# Patient Record
Sex: MALE | Race: WHITE | NOT HISPANIC OR LATINO
[De-identification: names, ages, dates, MRNs, and addresses within clinical notes are randomized per-mention and may not be internally consistent; named-entity substitution may affect disease eponyms.]

---

## 2019-07-10 ENCOUNTER — RECORD ABSTRACTING (OUTPATIENT)
Age: 44
End: 2019-07-10

## 2019-07-10 DIAGNOSIS — Z78.9 OTHER SPECIFIED HEALTH STATUS: ICD-10-CM

## 2019-07-10 DIAGNOSIS — Z87.2 PERSONAL HISTORY OF DISEASES OF THE SKIN AND SUBCUTANEOUS TISSUE: ICD-10-CM

## 2019-07-10 DIAGNOSIS — Z87.19 PERSONAL HISTORY OF OTHER DISEASES OF THE DIGESTIVE SYSTEM: ICD-10-CM

## 2019-07-10 PROBLEM — Z00.00 ENCOUNTER FOR PREVENTIVE HEALTH EXAMINATION: Status: ACTIVE | Noted: 2019-07-10

## 2019-07-24 ENCOUNTER — APPOINTMENT (OUTPATIENT)
Dept: INTERNAL MEDICINE | Facility: CLINIC | Age: 44
End: 2019-07-24
Payer: COMMERCIAL

## 2019-07-24 VITALS
WEIGHT: 210 LBS | DIASTOLIC BLOOD PRESSURE: 80 MMHG | BODY MASS INDEX: 31.83 KG/M2 | HEIGHT: 68 IN | HEART RATE: 69 BPM | OXYGEN SATURATION: 98 % | TEMPERATURE: 98.9 F | SYSTOLIC BLOOD PRESSURE: 126 MMHG

## 2019-07-24 DIAGNOSIS — K64.9 UNSPECIFIED HEMORRHOIDS: ICD-10-CM

## 2019-07-24 DIAGNOSIS — Z80.8 FAMILY HISTORY OF MALIGNANT NEOPLASM OF OTHER ORGANS OR SYSTEMS: ICD-10-CM

## 2019-07-24 PROCEDURE — G0444 DEPRESSION SCREEN ANNUAL: CPT

## 2019-07-24 PROCEDURE — 82274 ASSAY TEST FOR BLOOD FECAL: CPT | Mod: QW

## 2019-07-24 PROCEDURE — 82270 OCCULT BLOOD FECES: CPT

## 2019-07-24 PROCEDURE — 36415 COLL VENOUS BLD VENIPUNCTURE: CPT

## 2019-07-24 PROCEDURE — 99396 PREV VISIT EST AGE 40-64: CPT | Mod: 25

## 2019-08-09 LAB
ALBUMIN SERPL ELPH-MCNC: 5 G/DL
ALP BLD-CCNC: 51 U/L
ALT SERPL-CCNC: 39 U/L
ANION GAP SERPL CALC-SCNC: 14 MMOL/L
AST SERPL-CCNC: 25 U/L
BASOPHILS # BLD AUTO: 0.03 K/UL
BASOPHILS NFR BLD AUTO: 0.7 %
BILIRUB SERPL-MCNC: 0.3 MG/DL
BUN SERPL-MCNC: 12 MG/DL
CALCIUM SERPL-MCNC: 9.4 MG/DL
CHLORIDE SERPL-SCNC: 102 MMOL/L
CHOLEST SERPL-MCNC: 236 MG/DL
CHOLEST/HDLC SERPL: 5.2 RATIO
CO2 SERPL-SCNC: 22 MMOL/L
CREAT SERPL-MCNC: 0.81 MG/DL
DATE COLLECTED: NORMAL
EOSINOPHIL # BLD AUTO: 0.11 K/UL
EOSINOPHIL NFR BLD AUTO: 2.6 %
ESTIMATED AVERAGE GLUCOSE: 111 MG/DL
GLUCOSE SERPL-MCNC: 120 MG/DL
HBA1C MFR BLD HPLC: 5.5 %
HCT VFR BLD CALC: 49.1 %
HDLC SERPL-MCNC: 45 MG/DL
HEMOCCULT SP1 STL QL: NEGATIVE
HGB BLD-MCNC: 16 G/DL
IMM GRANULOCYTES NFR BLD AUTO: 0.7 %
LDLC SERPL CALC-MCNC: 147 MG/DL
LYMPHOCYTES # BLD AUTO: 1.18 K/UL
LYMPHOCYTES NFR BLD AUTO: 28.3 %
MAN DIFF?: NORMAL
MCHC RBC-ENTMCNC: 30.5 PG
MCHC RBC-ENTMCNC: 32.6 GM/DL
MCV RBC AUTO: 93.5 FL
MONOCYTES # BLD AUTO: 0.36 K/UL
MONOCYTES NFR BLD AUTO: 8.6 %
NEUTROPHILS # BLD AUTO: 2.46 K/UL
NEUTROPHILS NFR BLD AUTO: 59.1 %
PLATELET # BLD AUTO: 231 K/UL
POTASSIUM SERPL-SCNC: 4.2 MMOL/L
PROT SERPL-MCNC: 7 G/DL
PSA FREE FLD-MCNC: 35 %
PSA FREE SERPL-MCNC: 0.22 NG/ML
PSA SERPL-MCNC: 0.63 NG/ML
QUALITY CONTROL: YES
RBC # BLD: 5.25 M/UL
RBC # FLD: 11.9 %
SODIUM SERPL-SCNC: 138 MMOL/L
TRIGL SERPL-MCNC: 221 MG/DL
TSH SERPL-ACNC: 2.45 UIU/ML
WBC # FLD AUTO: 4.17 K/UL

## 2019-08-15 ENCOUNTER — APPOINTMENT (OUTPATIENT)
Dept: GASTROENTEROLOGY | Facility: CLINIC | Age: 44
End: 2019-08-15
Payer: COMMERCIAL

## 2019-08-15 VITALS
HEIGHT: 68 IN | HEART RATE: 59 BPM | BODY MASS INDEX: 31.83 KG/M2 | OXYGEN SATURATION: 98 % | SYSTOLIC BLOOD PRESSURE: 120 MMHG | TEMPERATURE: 98.4 F | DIASTOLIC BLOOD PRESSURE: 70 MMHG | WEIGHT: 210 LBS

## 2019-08-15 DIAGNOSIS — R19.5 OTHER FECAL ABNORMALITIES: ICD-10-CM

## 2019-08-15 PROCEDURE — 99244 OFF/OP CNSLTJ NEW/EST MOD 40: CPT

## 2019-08-15 NOTE — PHYSICAL EXAM
[General Appearance - Alert] : alert [General Appearance - In No Acute Distress] : in no acute distress [Sclera] : the sclera and conjunctiva were normal [Outer Ear] : the ears and nose were normal in appearance [Neck Appearance] : the appearance of the neck was normal [] : no respiratory distress [Abnormal Walk] : normal gait [Skin Color & Pigmentation] : normal skin color and pigmentation [Cranial Nerves] : cranial nerves 2-12 were intact [Oriented To Time, Place, And Person] : oriented to person, place, and time

## 2019-08-15 NOTE — HISTORY OF PRESENT ILLNESS
[FreeTextEntry1] : Ms. Durand is a pleasant 43M h/o psoriatic arthritis (no medications) who is seen at the request of Dr. Mahajan for evaluation of change in the caliber and frequency of his stool.\par \par Reports a long history of alternating constipation and diarrhea.  When he is having diarrhea, he will have 5-10 BMs daily, loose brown stool with mucus, no blood.  Has associated lower abdominal cramps.\par \par Currently he is complaining of constipation with hard brown stool, BM every 2-3 days, has noticed narrowing of his stool and occasional mucus.  He has lost weight through dietary changes with intermittent fasting.  Used to drink up to 4 drinks nightly, has stopped this for over 2 years, now will only have a few drinks on the weekends.\par \par No family history of colorectal or other GI malignancies.\par \par Has never had a colonoscopy.

## 2019-08-15 NOTE — ASSESSMENT
[FreeTextEntry1] : Will plan on a colonoscopy for change in the caliber and frequency of stool, mucus in stool - r/o IBD given his psoriatic arthritis, mucus in stool.  Explained risks/benefits/alternatives including not limited to bleeding, infection, perforation, missed lesions, anesthesia complications.  Patient understands and agrees, all questions answered.  Will use a split dose miralax/gatorade prep with clears the day prior.\par \par Will need colonic biopsies during his colonoscopy.\par \par Thank you for referring Mr. GILLESPIE.  Please do not hesitate to call to further discuss his/her care.\par \par Note faxed to requesting MD.\par \par

## 2019-08-22 ENCOUNTER — TRANSCRIPTION ENCOUNTER (OUTPATIENT)
Age: 44
End: 2019-08-22

## 2019-09-24 ENCOUNTER — CHART COPY (OUTPATIENT)
Age: 44
End: 2019-09-24

## 2019-10-18 ENCOUNTER — RESULT REVIEW (OUTPATIENT)
Age: 44
End: 2019-10-18

## 2019-10-19 ENCOUNTER — APPOINTMENT (OUTPATIENT)
Dept: GASTROENTEROLOGY | Facility: HOSPITAL | Age: 44
End: 2019-10-19

## 2019-12-05 ENCOUNTER — APPOINTMENT (OUTPATIENT)
Dept: INTERNAL MEDICINE | Facility: CLINIC | Age: 44
End: 2019-12-05

## 2020-03-05 ENCOUNTER — APPOINTMENT (OUTPATIENT)
Dept: INTERNAL MEDICINE | Facility: CLINIC | Age: 45
End: 2020-03-05
Payer: COMMERCIAL

## 2020-03-05 ENCOUNTER — NON-APPOINTMENT (OUTPATIENT)
Age: 45
End: 2020-03-05

## 2020-03-05 VITALS
HEIGHT: 68 IN | WEIGHT: 195 LBS | SYSTOLIC BLOOD PRESSURE: 122 MMHG | DIASTOLIC BLOOD PRESSURE: 80 MMHG | HEART RATE: 56 BPM | BODY MASS INDEX: 29.55 KG/M2 | TEMPERATURE: 97.9 F | OXYGEN SATURATION: 98 %

## 2020-03-05 PROCEDURE — 99214 OFFICE O/P EST MOD 30 MIN: CPT | Mod: 25

## 2020-03-05 PROCEDURE — 93000 ELECTROCARDIOGRAM COMPLETE: CPT

## 2020-03-05 PROCEDURE — 36415 COLL VENOUS BLD VENIPUNCTURE: CPT

## 2020-03-05 RX ORDER — OMEPRAZOLE MAGNESIUM 20 MG/1
20 TABLET, DELAYED RELEASE ORAL DAILY
Refills: 0 | Status: DISCONTINUED | COMMUNITY
Start: 2019-07-24 | End: 2020-03-05

## 2020-03-08 ENCOUNTER — NON-APPOINTMENT (OUTPATIENT)
Age: 45
End: 2020-03-08

## 2020-03-09 ENCOUNTER — APPOINTMENT (OUTPATIENT)
Dept: CARDIOLOGY | Facility: CLINIC | Age: 45
End: 2020-03-09
Payer: COMMERCIAL

## 2020-03-09 DIAGNOSIS — R00.2 PALPITATIONS: ICD-10-CM

## 2020-03-09 PROCEDURE — 0296T: CPT

## 2020-03-09 PROCEDURE — 99204 OFFICE O/P NEW MOD 45 MIN: CPT

## 2020-03-19 PROCEDURE — 0298T: CPT

## 2020-03-20 LAB
ALBUMIN SERPL ELPH-MCNC: 4.9 G/DL
ALP BLD-CCNC: 50 U/L
ALT SERPL-CCNC: 29 U/L
ANION GAP SERPL CALC-SCNC: 14 MMOL/L
AST SERPL-CCNC: 32 U/L
BILIRUB SERPL-MCNC: 0.3 MG/DL
BUN SERPL-MCNC: 13 MG/DL
CALCIUM SERPL-MCNC: 9.6 MG/DL
CHLORIDE SERPL-SCNC: 102 MMOL/L
CHOLEST SERPL-MCNC: 206 MG/DL
CHOLEST/HDLC SERPL: 3.3 RATIO
CO2 SERPL-SCNC: 22 MMOL/L
CREAT SERPL-MCNC: 0.85 MG/DL
ESTIMATED AVERAGE GLUCOSE: 108 MG/DL
GLUCOSE SERPL-MCNC: 118 MG/DL
HBA1C MFR BLD HPLC: 5.4 %
HDLC SERPL-MCNC: 62 MG/DL
LDLC SERPL CALC-MCNC: 126 MG/DL
POTASSIUM SERPL-SCNC: 4.5 MMOL/L
PROT SERPL-MCNC: 6.8 G/DL
SODIUM SERPL-SCNC: 138 MMOL/L
TRIGL SERPL-MCNC: 89 MG/DL
TSH SERPL-ACNC: 2.16 UIU/ML

## 2021-11-04 ENCOUNTER — APPOINTMENT (OUTPATIENT)
Dept: INTERNAL MEDICINE | Facility: CLINIC | Age: 46
End: 2021-11-04

## 2021-11-08 ENCOUNTER — APPOINTMENT (OUTPATIENT)
Dept: FAMILY MEDICINE | Facility: CLINIC | Age: 46
End: 2021-11-08
Payer: COMMERCIAL

## 2021-11-08 VITALS
RESPIRATION RATE: 16 BRPM | HEART RATE: 72 BPM | SYSTOLIC BLOOD PRESSURE: 130 MMHG | TEMPERATURE: 98.9 F | OXYGEN SATURATION: 98 % | WEIGHT: 190 LBS | DIASTOLIC BLOOD PRESSURE: 82 MMHG | HEIGHT: 68 IN | BODY MASS INDEX: 28.79 KG/M2

## 2021-11-08 DIAGNOSIS — M54.16 RADICULOPATHY, LUMBAR REGION: ICD-10-CM

## 2021-11-08 DIAGNOSIS — Z23 ENCOUNTER FOR IMMUNIZATION: ICD-10-CM

## 2021-11-08 PROCEDURE — 90686 IIV4 VACC NO PRSV 0.5 ML IM: CPT

## 2021-11-08 PROCEDURE — 36415 COLL VENOUS BLD VENIPUNCTURE: CPT

## 2021-11-08 PROCEDURE — 99396 PREV VISIT EST AGE 40-64: CPT | Mod: 25

## 2021-11-08 PROCEDURE — G0008: CPT

## 2021-11-11 LAB
ANION GAP SERPL CALC-SCNC: 12 MMOL/L
BUN SERPL-MCNC: 10 MG/DL
CALCIUM SERPL-MCNC: 9.7 MG/DL
CHLORIDE SERPL-SCNC: 98 MMOL/L
CHOLEST SERPL-MCNC: 200 MG/DL
CO2 SERPL-SCNC: 24 MMOL/L
CREAT SERPL-MCNC: 0.83 MG/DL
GLUCOSE SERPL-MCNC: 109 MG/DL
HDLC SERPL-MCNC: 53 MG/DL
LDLC SERPL CALC-MCNC: 117 MG/DL
NONHDLC SERPL-MCNC: 146 MG/DL
POTASSIUM SERPL-SCNC: 4.4 MMOL/L
SODIUM SERPL-SCNC: 134 MMOL/L
TRIGL SERPL-MCNC: 147 MG/DL

## 2021-12-24 ENCOUNTER — TRANSCRIPTION ENCOUNTER (OUTPATIENT)
Age: 46
End: 2021-12-24

## 2023-10-09 ENCOUNTER — APPOINTMENT (OUTPATIENT)
Dept: INTERNAL MEDICINE | Facility: CLINIC | Age: 48
End: 2023-10-09
Payer: COMMERCIAL

## 2023-10-09 ENCOUNTER — NON-APPOINTMENT (OUTPATIENT)
Age: 48
End: 2023-10-09

## 2023-10-09 VITALS
OXYGEN SATURATION: 99 % | DIASTOLIC BLOOD PRESSURE: 80 MMHG | TEMPERATURE: 98.6 F | HEIGHT: 68 IN | SYSTOLIC BLOOD PRESSURE: 122 MMHG | WEIGHT: 196 LBS | RESPIRATION RATE: 16 BRPM | HEART RATE: 65 BPM | BODY MASS INDEX: 29.7 KG/M2

## 2023-10-09 DIAGNOSIS — R73.09 OTHER ABNORMAL GLUCOSE: ICD-10-CM

## 2023-10-09 DIAGNOSIS — N52.9 MALE ERECTILE DYSFUNCTION, UNSPECIFIED: ICD-10-CM

## 2023-10-09 DIAGNOSIS — S22.42XA MULTIPLE FRACTURES OF RIBS, LEFT SIDE, INITIAL ENCOUNTER FOR CLOSED FRACTURE: ICD-10-CM

## 2023-10-09 DIAGNOSIS — Z12.5 ENCOUNTER FOR SCREENING FOR MALIGNANT NEOPLASM OF PROSTATE: ICD-10-CM

## 2023-10-09 DIAGNOSIS — Z00.00 ENCOUNTER FOR GENERAL ADULT MEDICAL EXAMINATION W/OUT ABNORMAL FINDINGS: ICD-10-CM

## 2023-10-09 DIAGNOSIS — D22.9 MELANOCYTIC NEVI, UNSPECIFIED: ICD-10-CM

## 2023-10-09 DIAGNOSIS — Z11.9 ENCOUNTER FOR SCREENING FOR INFECTIOUS AND PARASITIC DISEASES, UNSPECIFIED: ICD-10-CM

## 2023-10-09 DIAGNOSIS — E78.5 HYPERLIPIDEMIA, UNSPECIFIED: ICD-10-CM

## 2023-10-09 DIAGNOSIS — R39.198 OTHER DIFFICULTIES WITH MICTURITION: ICD-10-CM

## 2023-10-09 PROCEDURE — 36415 COLL VENOUS BLD VENIPUNCTURE: CPT

## 2023-10-09 PROCEDURE — 90686 IIV4 VACC NO PRSV 0.5 ML IM: CPT

## 2023-10-09 PROCEDURE — 99213 OFFICE O/P EST LOW 20 MIN: CPT | Mod: 25

## 2023-10-09 PROCEDURE — G0008: CPT

## 2023-10-09 PROCEDURE — 99396 PREV VISIT EST AGE 40-64: CPT | Mod: 25

## 2023-10-12 PROBLEM — E78.5 HYPERLIPIDEMIA, UNSPECIFIED HYPERLIPIDEMIA TYPE: Status: ACTIVE | Noted: 2020-03-05

## 2023-10-12 PROBLEM — D22.9 NUMEROUS SKIN MOLES: Status: ACTIVE | Noted: 2019-07-24

## 2023-10-12 PROBLEM — S22.42XA MULTIPLE FRACTURES OF RIBS OF LEFT SIDE: Status: ACTIVE | Noted: 2023-10-12

## 2023-10-28 LAB
ALBUMIN SERPL ELPH-MCNC: 5 G/DL
ALP BLD-CCNC: 74 U/L
ALT SERPL-CCNC: 30 U/L
ANION GAP SERPL CALC-SCNC: 13 MMOL/L
APPEARANCE: CLEAR
AST SERPL-CCNC: 29 U/L
BACTERIA: NEGATIVE /HPF
BASOPHILS # BLD AUTO: 0.05 K/UL
BASOPHILS NFR BLD AUTO: 0.9 %
BILIRUB SERPL-MCNC: 0.4 MG/DL
BILIRUBIN URINE: NEGATIVE
BLOOD URINE: NEGATIVE
BUN SERPL-MCNC: 11 MG/DL
CALCIUM SERPL-MCNC: 9.5 MG/DL
CAST: 0 /LPF
CHLORIDE SERPL-SCNC: 101 MMOL/L
CHOLEST SERPL-MCNC: 232 MG/DL
CO2 SERPL-SCNC: 23 MMOL/L
COLOR: YELLOW
CREAT SERPL-MCNC: 0.81 MG/DL
EGFR: 107 ML/MIN/1.73M2
EOSINOPHIL # BLD AUTO: 0.11 K/UL
EOSINOPHIL NFR BLD AUTO: 1.9 %
EPITHELIAL CELLS: 0 /HPF
ESTIMATED AVERAGE GLUCOSE: 108 MG/DL
GLUCOSE QUALITATIVE U: NEGATIVE MG/DL
GLUCOSE SERPL-MCNC: 98 MG/DL
HBA1C MFR BLD HPLC: 5.4 %
HCT VFR BLD CALC: 43.1 %
HCV AB SER QL: NONREACTIVE
HCV S/CO RATIO: 0.06 S/CO
HDLC SERPL-MCNC: 47 MG/DL
HGB BLD-MCNC: 15 G/DL
HIV1+2 AB SPEC QL IA.RAPID: NONREACTIVE
IMM GRANULOCYTES NFR BLD AUTO: 0.5 %
KETONES URINE: NEGATIVE MG/DL
LDLC SERPL CALC-MCNC: 153 MG/DL
LEUKOCYTE ESTERASE URINE: NEGATIVE
LYMPHOCYTES # BLD AUTO: 1.41 K/UL
LYMPHOCYTES NFR BLD AUTO: 24.2 %
MAN DIFF?: NORMAL
MCHC RBC-ENTMCNC: 31.1 PG
MCHC RBC-ENTMCNC: 34.8 GM/DL
MCV RBC AUTO: 89.4 FL
MICROSCOPIC-UA: NORMAL
MONOCYTES # BLD AUTO: 0.43 K/UL
MONOCYTES NFR BLD AUTO: 7.4 %
NEUTROPHILS # BLD AUTO: 3.8 K/UL
NEUTROPHILS NFR BLD AUTO: 65.1 %
NITRITE URINE: NEGATIVE
NONHDLC SERPL-MCNC: 185 MG/DL
PH URINE: 7
PLATELET # BLD AUTO: 245 K/UL
POTASSIUM SERPL-SCNC: 4.1 MMOL/L
PROT SERPL-MCNC: 7 G/DL
PROTEIN URINE: NEGATIVE MG/DL
PSA FREE FLD-MCNC: 25 %
PSA FREE SERPL-MCNC: 0.16 NG/ML
PSA SERPL-MCNC: 0.63 NG/ML
RBC # BLD: 4.82 M/UL
RBC # FLD: 11.9 %
RED BLOOD CELLS URINE: 0 /HPF
SODIUM SERPL-SCNC: 138 MMOL/L
SPECIFIC GRAVITY URINE: 1.01
TESTOST FREE SERPL-MCNC: 5.8 PG/ML
TESTOST SERPL-MCNC: 335 NG/DL
TRIGL SERPL-MCNC: 176 MG/DL
TSH SERPL-ACNC: 1.44 UIU/ML
UROBILINOGEN URINE: 0.2 MG/DL
WBC # FLD AUTO: 5.83 K/UL
WHITE BLOOD CELLS URINE: 0 /HPF

## 2024-02-27 ENCOUNTER — NON-APPOINTMENT (OUTPATIENT)
Age: 49
End: 2024-02-27

## 2024-06-17 ENCOUNTER — APPOINTMENT (OUTPATIENT)
Dept: NEUROLOGY | Facility: CLINIC | Age: 49
End: 2024-06-17
Payer: COMMERCIAL

## 2024-06-17 ENCOUNTER — NON-APPOINTMENT (OUTPATIENT)
Age: 49
End: 2024-06-17

## 2024-06-17 VITALS
BODY MASS INDEX: 30.31 KG/M2 | SYSTOLIC BLOOD PRESSURE: 138 MMHG | HEART RATE: 57 BPM | DIASTOLIC BLOOD PRESSURE: 89 MMHG | HEIGHT: 68 IN | OXYGEN SATURATION: 96 % | WEIGHT: 200 LBS

## 2024-06-17 DIAGNOSIS — R25.2 CRAMP AND SPASM: ICD-10-CM

## 2024-06-17 DIAGNOSIS — M62.838 OTHER MUSCLE SPASM: ICD-10-CM

## 2024-06-17 DIAGNOSIS — R25.3 FASCICULATION: ICD-10-CM

## 2024-06-17 PROCEDURE — 99205 OFFICE O/P NEW HI 60 MIN: CPT

## 2024-06-17 RX ORDER — IBUPROFEN 200 MG/1
200 TABLET, COATED ORAL 3 TIMES DAILY
Refills: 0 | Status: DISCONTINUED | COMMUNITY
Start: 2023-10-09 | End: 2024-06-17

## 2024-06-19 NOTE — HISTORY OF PRESENT ILLNESS
[FreeTextEntry1] : Mr. ANGELA GILLESPIE is a 48 year old male here today in neurology consultation for reports of involuntary muscle spasms and muscular atrophy. Hx hld, 2 left lower rib fractures & splenic contusion s/p fall in 2023, lumbar radiculopathy, R foot sprain, and psoriatic arthritis on no medication.   Kevin Barajas noticed atrophy of his LLE about 3 years ago. At that time, he developed left hip pain while doing stationary bike exercises and went to [facility] ED for severe pain. MRI showed lumbar radiculopathy. Saw 2 ortho spine specialists; one of whom recommended consultation with ortho hip specialist who administered a steroid hip injection with resolution of left hip pain.   For the past few years his exercise regimen is swimming daily, stationary bike and walking for 90 mins. A year ago, lateral rotation of left hip during exercises caused "popping." This is now reduced with exercises. Last left hip x-ray several years ago reportedly revealed mild OA. Last PT sessions for left hip pain were several years ago, and PT also recommended aqua therapy which pt completed then continued daily swimming.   Pt reports his left leg easily cramps during certain exercises. Cramps affect sole of feet under the toes only and the toes daily. Standing on the feet relieves cramping promptly. LLE affected by involuntary painless muscle twitching along entire leg, along medial surface c/w fasciculations, present for about one year.   Reports right foot sprain this past year that included decreased sensation and slight numbness underneath R toes.   No medications or supplements Water - 32 oz bottle x 3-4 daily.  Exercise - 90 daily   Teri Stewart NP collaborated with the neurologist during today's visit.

## 2024-06-19 NOTE — PHYSICAL EXAM
[FreeTextEntry1] : Physical Exam In NAD Mood appropriate  Negative R & L straight leg raise.  External rotation of R and L hip wnl.  Mental Status Awake, alert and oriented to person, place, time and situation. Provides detailed history.   Cranial Nerves II: VFF III, IV, VI: PERRL, EOMI. V: Facial sensation is normal B/L. VII: Facial strength is symmetric. VIII: wnl IX, X: Palate is midline and elevates symmetrically. XI: Trapezius normal strength. XII: Tongue midline without atrophy or fasciculations.  MOTOR EXAM Muscle tone - no evidence of rigidity or resistance in all 4 extremities. No muscle atrophy  (+) RLE calf muscle fasciculations with stimulation.  Muscle Strength: arms and legs, proximal and distal flexors and extensors are normal. No UE drift. No tremor with hands outstretched    REFLEXES  All present, normal, and symmetrical; B biceps/triceps/radial 1+; B ankle 1+ and B patella 2+ Right Plantar: mute. Left Plantar: mute.   COORDINATION Finger to nose: Normal. Heel to shin: Normal.   SENSORY Vibration: intact  Sensation: light touch, pin prick distribution and cold intact   GAIT Normal. Tandem walk normal. Able to walk on toes and heels. Romberg negative.

## 2024-06-19 NOTE — ASSESSMENT
[FreeTextEntry1] : Kevin Barajas is experiencing LLE fasciculations and bilateral toe cramps daily. He is drinking about 128 oz water daily while completing 90 minutes of daily exercise. He is not able to run related to fear of foot cramping and the concern for a possible injury as a result.   Plan of Care - Lab work ordered Testing - EMG and NCV ordered Warm up exercises before all exercise   Referral - to primary care provider at pt's request to establish care  Neurology follow-up TBD when testing is complete    Recommendations for Brain Health - - healthy eating/diet with diet rich in fiber, fruits, vegetables, and low in saturated fats, processed foods. - good sleep, 7-8 hours a night. No use of phone or watching television before bed. - aerobic exercise, at least 30 minutes, such as a brisk walk 3-4 times a week. - abstaining from excess alcohol, or drug use. - blood pressure, cholesterol, blood glucose monitoring to prevent microvascular disease which can lead to cognitive impairment. - obtain adequate water intake. 8 cups of water daily or as recommended by your pcp if fluid restricted related to medical condition.

## 2024-06-19 NOTE — REASON FOR VISIT
[Consultation] : a consultation visit [FreeTextEntry1] : Pt came today involuntary muscle spasm and muscular atrophy.

## 2024-08-01 ENCOUNTER — APPOINTMENT (OUTPATIENT)
Dept: NEUROLOGY | Facility: CLINIC | Age: 49
End: 2024-08-01
Payer: COMMERCIAL

## 2024-08-01 DIAGNOSIS — E55.9 VITAMIN D DEFICIENCY, UNSPECIFIED: ICD-10-CM

## 2024-08-01 DIAGNOSIS — M54.16 RADICULOPATHY, LUMBAR REGION: ICD-10-CM

## 2024-08-01 DIAGNOSIS — R25.3 FASCICULATION: ICD-10-CM

## 2024-08-01 PROCEDURE — 95910 NRV CNDJ TEST 7-8 STUDIES: CPT

## 2024-08-01 PROCEDURE — 95886 MUSC TEST DONE W/N TEST COMP: CPT

## 2024-08-01 PROCEDURE — 99214 OFFICE O/P EST MOD 30 MIN: CPT | Mod: 25

## 2024-08-01 NOTE — ASSESSMENT
[FreeTextEntry1] : Kevin Barajas is experiencing LLE fasciculations and bilateral toe cramps daily. He is drinking about 128 oz water daily while completing 90 minutes of daily exercise. He is not able to run related to fear of foot cramping and the concern for a possible injury as a result.   EMG/NCV raises concern for lumbar radiculopathy - this may explain his calf atrophy and fasciculations. EMG did not show any evidence of neuropathy or motor neuron disease.

## 2024-08-01 NOTE — HISTORY OF PRESENT ILLNESS
[FreeTextEntry1] : 8/1/31 Continues to have fasciculations in the left leg -  left calf smaller than right calf feels left leg weaker  H/o LBP with radiation into hip and leg at one point. Had left hip steroid injection which resolved hip pain.   Able to swim.  Notes cramping in his extremities. Has some swelling in ankles (right greater than left)- h/p psoriatic arthritis. No bowel or bladder complaints.  MRI lumbar spine at [facility] - 2021 From Dr. Avelar's note:  "degenerative disc disease at L4-5 AND L5-S1. Facet hypertrophy at L5-S1 caused moderate foraminal stenosis on the left side and mild to moderate foraminal stenosis on the right side. The superior articulating process of S1 does appear to be compression the exiting L5 nerve root on the left side."  6/17/24 Mr. ANGELA GILLESPIE is a 48 year old male here today in neurology consultation for reports of involuntary muscle spasms and muscular atrophy. Hx hld, 2 left lower rib fractures & splenic contusion s/p fall in 2023, lumbar radiculopathy, R foot sprain, and psoriatic arthritis on no medication.   Kevin Barajas noticed atrophy of his LLE about 3 years ago. At that time, he developed left hip pain while doing stationary bike exercises and went to [facility] ED for severe pain. MRI showed lumbar radiculopathy. Saw 2 ortho spine specialists; one of whom recommended consultation with ortho hip specialist who administered a steroid hip injection with resolution of left hip pain.   For the past few years his exercise regimen is swimming daily, stationary bike and walking for 90 mins. A year ago, lateral rotation of left hip during exercises caused "popping." This is now reduced with exercises. Last left hip x-ray several years ago reportedly revealed mild OA. Last PT sessions for left hip pain were several years ago, and PT also recommended aqua therapy which pt completed then continued daily swimming.   Pt reports his left leg easily cramps during certain exercises. Cramps affect sole of feet under the toes only and the toes daily. Standing on the feet relieves cramping promptly. LLE affected by involuntary painless muscle twitching along entire leg, along medial surface c/w fasciculations, present for about one year.   Reports right foot sprain this past year that included decreased sensation and slight numbness underneath R toes.   No medications or supplements Water - 32 oz bottle x 3-4 daily.  Exercise - 90 daily   Teri Stewart NP collaborated with the neurologist during today's visit.  [employer]

## 2024-08-01 NOTE — HISTORY OF PRESENT ILLNESS
[FreeTextEntry1] : 8/1/31 Continues to have fasciculations in the left leg -  left calf smaller than right calf feels left leg weaker  H/o LBP with radiation into hip and leg at one point. Had left hip steroid injection which resolved hip pain.   Able to swim.  Notes cramping in his extremities. Has some swelling in ankles (right greater than left)- h/p psoriatic arthritis. No bowel or bladder complaints.  MRI lumbar spine at [facility] - 2021 From Dr. Avelar's note:  "degenerative disc disease at L4-5 AND L5-S1. Facet hypertrophy at L5-S1 caused moderate foraminal stenosis on the left side and mild to moderate foraminal stenosis on the right side. The superior articulating process of S1 does appear to be compression the exiting L5 nerve root on the left side."  6/17/24 Mr. ANGELA GILLESPIE is a 48 year old male here today in neurology consultation for reports of involuntary muscle spasms and muscular atrophy. Hx hld, 2 left lower rib fractures & splenic contusion s/p fall in 2023, lumbar radiculopathy, R foot sprain, and psoriatic arthritis on no medication.   Kevin Barajas noticed atrophy of his LLE about 3 years ago. At that time, he developed left hip pain while doing stationary bike exercises and went to [facility] ED for severe pain. MRI showed lumbar radiculopathy. Saw 2 ortho spine specialists; one of whom recommended consultation with ortho hip specialist who administered a steroid hip injection with resolution of left hip pain.   For the past few years his exercise regimen is swimming daily, stationary bike and walking for 90 mins. A year ago, lateral rotation of left hip during exercises caused "popping." This is now reduced with exercises. Last left hip x-ray several years ago reportedly revealed mild OA. Last PT sessions for left hip pain were several years ago, and PT also recommended aqua therapy which pt completed then continued daily swimming.   Pt reports his left leg easily cramps during certain exercises. Cramps affect sole of feet under the toes only and the toes daily. Standing on the feet relieves cramping promptly. LLE affected by involuntary painless muscle twitching along entire leg, along medial surface c/w fasciculations, present for about one year.   Reports right foot sprain this past year that included decreased sensation and slight numbness underneath R toes.   No medications or supplements Water - 32 oz bottle x 3-4 daily.  Exercise - 90 daily   Teri Stewart NP collaborated with the neurologist during today's visit.

## 2024-08-01 NOTE — PHYSICAL EXAM
[FreeTextEntry1] : Physical Exam In NAD Mood appropriate  Negative R & L straight leg raise.  External rotation of R and L hip wnl.  Mental Status Awake, alert and oriented to person, place, time and situation. Provides detailed history.   Cranial Nerves II: VFF III, IV, VI: PERRL, EOMI. V: Facial sensation is normal B/L. VII: Facial strength is symmetric. VIII: wnl IX, X: Palate is midline and elevates symmetrically. XI: Trapezius normal strength. XII: Tongue midline without atrophy or fasciculations.  MOTOR EXAM Muscle tone - no evidence of rigidity or resistance in all 4 extremities. No muscle atrophy  (+) LLE calf muscle fasciculations with stimulation.  Muscle Strength: arms and legs, proximal and distal flexors and extensors are normal except or left hip flexion 4+/5 No UE drift. No tremor with hands outstretched    REFLEXES  All present, normal, and symmetrical; B biceps/triceps/radial 1+; B ankle 1+ and B patella 2+ Right Plantar: mute. Left Plantar: mute.     GAIT Normal

## 2024-08-01 NOTE — HISTORY OF PRESENT ILLNESS
[FreeTextEntry1] : 8/1/31 Continues to have fasciculations in the left leg -  left calf smaller than right calf feels left leg weaker  H/o LBP with radiation into hip and leg at one point. Had left hip steroid injection which resolved hip pain.   Able to swim.  Notes cramping in his extremities. Has some swelling in ankles (right greater than left)- h/p psoriatic arthritis. No bowel or bladder complaints.  MRI lumbar spine at [facility] - 2021 From Dr. Avelar's note:  "degenerative disc disease at L4-5 AND L5-S1. Facet hypertrophy at L5-S1 caused moderate foraminal stenosis on the left side and mild to moderate foraminal stenosis on the right side. The superior articulating process of S1 does appear to be compression the exiting L5 nerve root on the left side."  6/17/24 Mr. ANGELA GILLESPIE is a 48 year old male here today in neurology consultation for reports of involuntary muscle spasms and muscular atrophy. Hx hld, 2 left lower rib fractures & splenic contusion s/p fall in 2023, lumbar radiculopathy, R foot sprain, and psoriatic arthritis on no medication.   Kevin Barajas noticed atrophy of his LLE about 3 years ago. At that time, he developed left hip pain while doing stationary bike exercises and went to [facility] ED for severe pain. MRI showed lumbar radiculopathy. Saw 2 ortho spine specialists; one of whom recommended consultation with ortho hip specialist who administered a steroid hip injection with resolution of left hip pain.   For the past few years his exercise regimen is swimming daily, stationary bike and walking for 90 mins. A year ago, lateral rotation of left hip during exercises caused "popping." This is now reduced with exercises. Last left hip x-ray several years ago reportedly revealed mild OA. Last PT sessions for left hip pain were several years ago, and PT also recommended aqua therapy which pt completed then continued daily swimming.   Pt reports his left leg easily cramps during certain exercises. Cramps affect sole of feet under the toes only and the toes daily. Standing on the feet relieves cramping promptly. LLE affected by involuntary painless muscle twitching along entire leg, along medial surface c/w fasciculations, present for about one year.   Reports right foot sprain this past year that included decreased sensation and slight numbness underneath R toes.   No medications or supplements Water - 32 oz bottle x 3-4 daily.  Exercise - 90 daily   Teri Stewart NP collaborated with the neurologist during today's visit.

## 2024-08-29 ENCOUNTER — NON-APPOINTMENT (OUTPATIENT)
Age: 49
End: 2024-08-29

## 2024-10-10 ENCOUNTER — APPOINTMENT (OUTPATIENT)
Dept: FAMILY MEDICINE | Facility: CLINIC | Age: 49
End: 2024-10-10
Payer: COMMERCIAL

## 2024-10-10 VITALS
SYSTOLIC BLOOD PRESSURE: 124 MMHG | WEIGHT: 205 LBS | TEMPERATURE: 97.5 F | HEIGHT: 68 IN | DIASTOLIC BLOOD PRESSURE: 80 MMHG | OXYGEN SATURATION: 98 % | HEART RATE: 83 BPM | BODY MASS INDEX: 31.07 KG/M2 | RESPIRATION RATE: 16 BRPM

## 2024-10-10 DIAGNOSIS — E78.5 HYPERLIPIDEMIA, UNSPECIFIED: ICD-10-CM

## 2024-10-10 DIAGNOSIS — Z11.9 ENCOUNTER FOR SCREENING FOR INFECTIOUS AND PARASITIC DISEASES, UNSPECIFIED: ICD-10-CM

## 2024-10-10 DIAGNOSIS — S22.42XA MULTIPLE FRACTURES OF RIBS, LEFT SIDE, INITIAL ENCOUNTER FOR CLOSED FRACTURE: ICD-10-CM

## 2024-10-10 DIAGNOSIS — N52.9 MALE ERECTILE DYSFUNCTION, UNSPECIFIED: ICD-10-CM

## 2024-10-10 DIAGNOSIS — Z87.39 PERSONAL HISTORY OF OTHER DISEASES OF THE MUSCULOSKELETAL SYSTEM AND CONNECTIVE TISSUE: ICD-10-CM

## 2024-10-10 DIAGNOSIS — R39.198 OTHER DIFFICULTIES WITH MICTURITION: ICD-10-CM

## 2024-10-10 DIAGNOSIS — Z87.19 PERSONAL HISTORY OF OTHER DISEASES OF THE DIGESTIVE SYSTEM: ICD-10-CM

## 2024-10-10 DIAGNOSIS — Z12.5 ENCOUNTER FOR SCREENING FOR MALIGNANT NEOPLASM OF PROSTATE: ICD-10-CM

## 2024-10-10 DIAGNOSIS — Z23 ENCOUNTER FOR IMMUNIZATION: ICD-10-CM

## 2024-10-10 DIAGNOSIS — R19.5 OTHER FECAL ABNORMALITIES: ICD-10-CM

## 2024-10-10 DIAGNOSIS — Z87.898 PERSONAL HISTORY OF OTHER SPECIFIED CONDITIONS: ICD-10-CM

## 2024-10-10 DIAGNOSIS — R73.09 OTHER ABNORMAL GLUCOSE: ICD-10-CM

## 2024-10-10 PROCEDURE — 36415 COLL VENOUS BLD VENIPUNCTURE: CPT

## 2024-10-10 PROCEDURE — G0008: CPT

## 2024-10-10 PROCEDURE — 90656 IIV3 VACC NO PRSV 0.5 ML IM: CPT

## 2024-10-10 PROCEDURE — 99396 PREV VISIT EST AGE 40-64: CPT | Mod: 25

## 2024-10-10 RX ORDER — SILDENAFIL 50 MG/1
50 TABLET ORAL
Qty: 5 | Refills: 1 | Status: ACTIVE | COMMUNITY
Start: 2024-10-10 | End: 1900-01-01

## 2024-10-13 LAB
ALBUMIN SERPL ELPH-MCNC: 4.9 G/DL
ALP BLD-CCNC: 60 U/L
ALT SERPL-CCNC: 33 U/L
ANION GAP SERPL CALC-SCNC: 12 MMOL/L
AST SERPL-CCNC: 31 U/L
BILIRUB SERPL-MCNC: 0.6 MG/DL
BUN SERPL-MCNC: 14 MG/DL
CALCIUM SERPL-MCNC: 9.9 MG/DL
CHLORIDE SERPL-SCNC: 100 MMOL/L
CHOLEST SERPL-MCNC: 267 MG/DL
CO2 SERPL-SCNC: 26 MMOL/L
CREAT SERPL-MCNC: 0.86 MG/DL
EGFR: 105 ML/MIN/1.73M2
ESTIMATED AVERAGE GLUCOSE: 111 MG/DL
GLUCOSE SERPL-MCNC: 121 MG/DL
HBA1C MFR BLD HPLC: 5.5 %
HDLC SERPL-MCNC: 47 MG/DL
LDLC SERPL CALC-MCNC: 184 MG/DL
NONHDLC SERPL-MCNC: 221 MG/DL
POTASSIUM SERPL-SCNC: 5.1 MMOL/L
PROT SERPL-MCNC: 7.2 G/DL
PSA SERPL-MCNC: 0.59 NG/ML
SODIUM SERPL-SCNC: 139 MMOL/L
TRIGL SERPL-MCNC: 193 MG/DL

## 2024-10-14 ENCOUNTER — TRANSCRIPTION ENCOUNTER (OUTPATIENT)
Age: 49
End: 2024-10-14

## 2024-10-15 LAB
TESTOST FREE SERPL-MCNC: 6 PG/ML
TESTOST SERPL-MCNC: 446 NG/DL

## 2024-10-30 ENCOUNTER — RESULT REVIEW (OUTPATIENT)
Age: 49
End: 2024-10-30

## 2024-10-31 DIAGNOSIS — R91.8 OTHER NONSPECIFIC ABNORMAL FINDING OF LUNG FIELD: ICD-10-CM

## 2024-11-14 ENCOUNTER — RESULT REVIEW (OUTPATIENT)
Age: 49
End: 2024-11-14

## 2024-12-16 ENCOUNTER — APPOINTMENT (OUTPATIENT)
Dept: PULMONOLOGY | Facility: CLINIC | Age: 49
End: 2024-12-16
Payer: COMMERCIAL

## 2024-12-16 VITALS
TEMPERATURE: 97.5 F | DIASTOLIC BLOOD PRESSURE: 82 MMHG | BODY MASS INDEX: 30.31 KG/M2 | HEART RATE: 57 BPM | SYSTOLIC BLOOD PRESSURE: 134 MMHG | WEIGHT: 200 LBS | HEIGHT: 68 IN | OXYGEN SATURATION: 97 %

## 2024-12-16 DIAGNOSIS — R91.8 OTHER NONSPECIFIC ABNORMAL FINDING OF LUNG FIELD: ICD-10-CM

## 2024-12-16 PROCEDURE — 99204 OFFICE O/P NEW MOD 45 MIN: CPT

## 2025-03-17 ENCOUNTER — APPOINTMENT (OUTPATIENT)
Dept: NEUROLOGY | Facility: CLINIC | Age: 50
End: 2025-03-17

## 2025-05-13 ENCOUNTER — NON-APPOINTMENT (OUTPATIENT)
Age: 50
End: 2025-05-13